# Patient Record
Sex: MALE | Race: WHITE
[De-identification: names, ages, dates, MRNs, and addresses within clinical notes are randomized per-mention and may not be internally consistent; named-entity substitution may affect disease eponyms.]

---

## 2018-10-07 ENCOUNTER — HOSPITAL ENCOUNTER (INPATIENT)
Dept: HOSPITAL 62 - ER | Age: 38
LOS: 4 days | Discharge: HOME | DRG: 854 | End: 2018-10-11
Attending: INTERNAL MEDICINE | Admitting: INTERNAL MEDICINE
Payer: SELF-PAY

## 2018-10-07 DIAGNOSIS — L03.115: ICD-10-CM

## 2018-10-07 DIAGNOSIS — L02.415: ICD-10-CM

## 2018-10-07 DIAGNOSIS — L03.116: ICD-10-CM

## 2018-10-07 DIAGNOSIS — R74.0: ICD-10-CM

## 2018-10-07 DIAGNOSIS — L02.416: ICD-10-CM

## 2018-10-07 DIAGNOSIS — A41.9: Primary | ICD-10-CM

## 2018-10-07 LAB
ADD MANUAL DIFF: NO
BASOPHILS # BLD AUTO: 0.1 10^3/UL (ref 0–0.2)
BASOPHILS NFR BLD AUTO: 0.5 % (ref 0–2)
EOSINOPHIL # BLD AUTO: 0.4 10^3/UL (ref 0–0.6)
EOSINOPHIL NFR BLD AUTO: 2.6 % (ref 0–6)
ERYTHROCYTE [DISTWIDTH] IN BLOOD BY AUTOMATED COUNT: 13.5 % (ref 11.5–14)
HCT VFR BLD CALC: 35.7 % (ref 37.9–51)
HGB BLD-MCNC: 12.1 G/DL (ref 13.5–17)
LYMPHOCYTES # BLD AUTO: 1.3 10^3/UL (ref 0.5–4.7)
LYMPHOCYTES NFR BLD AUTO: 9.4 % (ref 13–45)
MCH RBC QN AUTO: 30 PG (ref 27–33.4)
MCHC RBC AUTO-ENTMCNC: 33.9 G/DL (ref 32–36)
MCV RBC AUTO: 89 FL (ref 80–97)
MONOCYTES # BLD AUTO: 1.2 10^3/UL (ref 0.1–1.4)
MONOCYTES NFR BLD AUTO: 8.4 % (ref 3–13)
NEUTROPHILS # BLD AUTO: 11.2 10^3/UL (ref 1.7–8.2)
NEUTS SEG NFR BLD AUTO: 79.1 % (ref 42–78)
PLATELET # BLD: 306 10^3/UL (ref 150–450)
RBC # BLD AUTO: 4.03 10^6/UL (ref 4.35–5.55)
TOTAL CELLS COUNTED % (AUTO): 100 %
WBC # BLD AUTO: 14.1 10^3/UL (ref 4–10.5)

## 2018-10-07 PROCEDURE — 80076 HEPATIC FUNCTION PANEL: CPT

## 2018-10-07 PROCEDURE — 87205 SMEAR GRAM STAIN: CPT

## 2018-10-07 PROCEDURE — 87040 BLOOD CULTURE FOR BACTERIA: CPT

## 2018-10-07 PROCEDURE — 80307 DRUG TEST PRSMV CHEM ANLYZR: CPT

## 2018-10-07 PROCEDURE — 87070 CULTURE OTHR SPECIMN AEROBIC: CPT

## 2018-10-07 PROCEDURE — 01470 ANES PX NRV MSC LW L/A/F NOS: CPT

## 2018-10-07 PROCEDURE — A6266 IMPREG GAUZE NO H20/SAL/YARD: HCPCS

## 2018-10-07 PROCEDURE — 80053 COMPREHEN METABOLIC PANEL: CPT

## 2018-10-07 PROCEDURE — 87077 CULTURE AEROBIC IDENTIFY: CPT

## 2018-10-07 PROCEDURE — 96365 THER/PROPH/DIAG IV INF INIT: CPT

## 2018-10-07 PROCEDURE — 87186 SC STD MICRODIL/AGAR DIL: CPT

## 2018-10-07 PROCEDURE — 80048 BASIC METABOLIC PNL TOTAL CA: CPT

## 2018-10-07 PROCEDURE — 96375 TX/PRO/DX INJ NEW DRUG ADDON: CPT

## 2018-10-07 PROCEDURE — 87075 CULTR BACTERIA EXCEPT BLOOD: CPT

## 2018-10-07 PROCEDURE — 99284 EMERGENCY DEPT VISIT MOD MDM: CPT

## 2018-10-07 PROCEDURE — 36415 COLL VENOUS BLD VENIPUNCTURE: CPT

## 2018-10-07 PROCEDURE — 80202 ASSAY OF VANCOMYCIN: CPT

## 2018-10-07 PROCEDURE — 85025 COMPLETE CBC W/AUTO DIFF WBC: CPT

## 2018-10-08 LAB
ADD MANUAL DIFF: NO
ALBUMIN SERPL-MCNC: 3.2 G/DL (ref 3.5–5)
ALBUMIN SERPL-MCNC: 4 G/DL (ref 3.5–5)
ALP SERPL-CCNC: 95 U/L (ref 38–126)
ALP SERPL-CCNC: 98 U/L (ref 38–126)
ALT SERPL-CCNC: 126 U/L (ref 21–72)
ALT SERPL-CCNC: 128 U/L (ref 21–72)
ANION GAP SERPL CALC-SCNC: 10 MMOL/L (ref 5–19)
ANION GAP SERPL CALC-SCNC: 7 MMOL/L (ref 5–19)
AST SERPL-CCNC: 103 U/L (ref 17–59)
AST SERPL-CCNC: 86 U/L (ref 17–59)
BASOPHILS # BLD AUTO: 0 10^3/UL (ref 0–0.2)
BASOPHILS NFR BLD AUTO: 0.3 % (ref 0–2)
BILIRUB DIRECT SERPL-MCNC: 0.3 MG/DL (ref 0–0.4)
BILIRUB DIRECT SERPL-MCNC: 0.5 MG/DL (ref 0–0.4)
BILIRUB SERPL-MCNC: 0.5 MG/DL (ref 0.2–1.3)
BILIRUB SERPL-MCNC: 0.8 MG/DL (ref 0.2–1.3)
BUN SERPL-MCNC: 7 MG/DL (ref 7–20)
BUN SERPL-MCNC: 8 MG/DL (ref 7–20)
CALCIUM: 8.9 MG/DL (ref 8.4–10.2)
CALCIUM: 9.4 MG/DL (ref 8.4–10.2)
CHLORIDE SERPL-SCNC: 102 MMOL/L (ref 98–107)
CHLORIDE SERPL-SCNC: 98 MMOL/L (ref 98–107)
CO2 SERPL-SCNC: 27 MMOL/L (ref 22–30)
CO2 SERPL-SCNC: 31 MMOL/L (ref 22–30)
EOSINOPHIL # BLD AUTO: 0.4 10^3/UL (ref 0–0.6)
EOSINOPHIL NFR BLD AUTO: 3.4 % (ref 0–6)
ERYTHROCYTE [DISTWIDTH] IN BLOOD BY AUTOMATED COUNT: 13.5 % (ref 11.5–14)
GLUCOSE SERPL-MCNC: 167 MG/DL (ref 75–110)
GLUCOSE SERPL-MCNC: 91 MG/DL (ref 75–110)
HCT VFR BLD CALC: 33.5 % (ref 37.9–51)
HGB BLD-MCNC: 11.6 G/DL (ref 13.5–17)
LYMPHOCYTES # BLD AUTO: 0.8 10^3/UL (ref 0.5–4.7)
LYMPHOCYTES NFR BLD AUTO: 6.2 % (ref 13–45)
MCH RBC QN AUTO: 30.4 PG (ref 27–33.4)
MCHC RBC AUTO-ENTMCNC: 34.6 G/DL (ref 32–36)
MCV RBC AUTO: 88 FL (ref 80–97)
MONOCYTES # BLD AUTO: 1.3 10^3/UL (ref 0.1–1.4)
MONOCYTES NFR BLD AUTO: 10.1 % (ref 3–13)
NEUTROPHILS # BLD AUTO: 9.9 10^3/UL (ref 1.7–8.2)
NEUTS SEG NFR BLD AUTO: 80 % (ref 42–78)
PLATELET # BLD: 279 10^3/UL (ref 150–450)
POTASSIUM SERPL-SCNC: 3.6 MMOL/L (ref 3.6–5)
POTASSIUM SERPL-SCNC: 3.9 MMOL/L (ref 3.6–5)
PROT SERPL-MCNC: 5.6 G/DL (ref 6.3–8.2)
PROT SERPL-MCNC: 6.9 G/DL (ref 6.3–8.2)
RBC # BLD AUTO: 3.81 10^6/UL (ref 4.35–5.55)
SODIUM SERPL-SCNC: 136.4 MMOL/L (ref 137–145)
SODIUM SERPL-SCNC: 138.6 MMOL/L (ref 137–145)
TOTAL CELLS COUNTED % (AUTO): 100 %
WBC # BLD AUTO: 12.4 10^3/UL (ref 4–10.5)

## 2018-10-08 RX ADMIN — HEPARIN SODIUM SCH UNIT: 5000 INJECTION, SOLUTION INTRAVENOUS; SUBCUTANEOUS at 21:49

## 2018-10-08 RX ADMIN — DOXYCYCLINE SCH MLS/HR: 100 INJECTION, POWDER, LYOPHILIZED, FOR SOLUTION INTRAVENOUS at 10:02

## 2018-10-08 RX ADMIN — MORPHINE SULFATE PRN MG: 10 INJECTION INTRAMUSCULAR; INTRAVENOUS; SUBCUTANEOUS at 16:43

## 2018-10-08 RX ADMIN — MORPHINE SULFATE PRN MG: 10 INJECTION INTRAMUSCULAR; INTRAVENOUS; SUBCUTANEOUS at 09:59

## 2018-10-08 RX ADMIN — Medication SCH ML: at 21:51

## 2018-10-08 RX ADMIN — VANCOMYCIN HYDROCHLORIDE SCH MLS/HR: 1 INJECTION, POWDER, LYOPHILIZED, FOR SOLUTION INTRAVENOUS at 20:19

## 2018-10-08 RX ADMIN — HEPARIN SODIUM SCH UNIT: 5000 INJECTION, SOLUTION INTRAVENOUS; SUBCUTANEOUS at 06:32

## 2018-10-08 RX ADMIN — DOXYCYCLINE SCH MLS/HR: 100 INJECTION, POWDER, LYOPHILIZED, FOR SOLUTION INTRAVENOUS at 21:50

## 2018-10-08 RX ADMIN — HEPARIN SODIUM SCH UNIT: 5000 INJECTION, SOLUTION INTRAVENOUS; SUBCUTANEOUS at 13:00

## 2018-10-08 RX ADMIN — VANCOMYCIN HYDROCHLORIDE SCH MLS/HR: 1 INJECTION, POWDER, LYOPHILIZED, FOR SOLUTION INTRAVENOUS at 12:51

## 2018-10-08 RX ADMIN — CEFTRIAXONE SODIUM SCH MLS/HR: 1 INJECTION, POWDER, FOR SOLUTION INTRAMUSCULAR; INTRAVENOUS at 21:50

## 2018-10-08 RX ADMIN — NICOTINE PRN EACH: 21 PATCH, EXTENDED RELEASE TOPICAL at 20:20

## 2018-10-08 RX ADMIN — Medication SCH: at 13:01

## 2018-10-08 RX ADMIN — MORPHINE SULFATE PRN MG: 10 INJECTION INTRAMUSCULAR; INTRAVENOUS; SUBCUTANEOUS at 21:48

## 2018-10-08 RX ADMIN — Medication SCH ML: at 06:33
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
## 2018-10-08 NOTE — RADIOLOGY REPORT (SQ)
CLINICAL DATA:



38-year-old male with swelling of right foot



TECHNICAL DATA:



Two x-ray views of the right foot were performed on 10/8/2018 at

2:18 AM.



COMPARISONS: None



FINDINGS:



There is no evidence of fracture or dislocation. There is no

significant arthritis or degenerative change. No focal lytic or

sclerotic bone lesions are seen.  

 

Bone mineralization is normal



There is diffuse soft tissue swelling along the dorsal aspect of

the right foot.



IMPRESSION:



No evidence of acute osseous injury involving the right foot.

There is diffuse soft tissue swelling along the dorsal aspect of

the right foot.

## 2018-10-09 LAB
BARBITURATES UR QL SCN: NEGATIVE
METHADONE UR QL SCN: NEGATIVE
PCP UR QL SCN: NEGATIVE
URINE AMPHETAMINES SCREEN: (no result)
URINE BENZODIAZEPINES SCREEN: (no result)
URINE COCAINE SCREEN: NEGATIVE
URINE MARIJUANA (THC) SCREEN: NEGATIVE
VANCOMYCIN,TROUGH: 7.6 UG/ML (ref 5–20)

## 2018-10-09 PROCEDURE — 0J9P0ZZ DRAINAGE OF LEFT LOWER LEG SUBCUTANEOUS TISSUE AND FASCIA, OPEN APPROACH: ICD-10-PCS | Performed by: SURGERY

## 2018-10-09 PROCEDURE — 0JDP0ZZ EXTRACTION OF LEFT LOWER LEG SUBCUTANEOUS TISSUE AND FASCIA, OPEN APPROACH: ICD-10-PCS | Performed by: SURGERY

## 2018-10-09 PROCEDURE — 0JDM0ZZ EXTRACTION OF LEFT UPPER LEG SUBCUTANEOUS TISSUE AND FASCIA, OPEN APPROACH: ICD-10-PCS | Performed by: SURGERY

## 2018-10-09 PROCEDURE — 0JDN0ZZ EXTRACTION OF RIGHT LOWER LEG SUBCUTANEOUS TISSUE AND FASCIA, OPEN APPROACH: ICD-10-PCS | Performed by: SURGERY

## 2018-10-09 PROCEDURE — 0JDL0ZZ EXTRACTION OF RIGHT UPPER LEG SUBCUTANEOUS TISSUE AND FASCIA, OPEN APPROACH: ICD-10-PCS | Performed by: SURGERY

## 2018-10-09 RX ADMIN — MORPHINE SULFATE PRN MG: 10 INJECTION INTRAMUSCULAR; INTRAVENOUS; SUBCUTANEOUS at 13:33

## 2018-10-09 RX ADMIN — HEPARIN SODIUM SCH: 5000 INJECTION, SOLUTION INTRAVENOUS; SUBCUTANEOUS at 07:02

## 2018-10-09 RX ADMIN — OXYCODONE AND ACETAMINOPHEN PRN TAB: 5; 325 TABLET ORAL at 18:18

## 2018-10-09 RX ADMIN — Medication SCH ML: at 21:22

## 2018-10-09 RX ADMIN — DOXYCYCLINE SCH MLS/HR: 100 INJECTION, POWDER, LYOPHILIZED, FOR SOLUTION INTRAVENOUS at 09:23

## 2018-10-09 RX ADMIN — VANCOMYCIN HYDROCHLORIDE SCH MLS/HR: 1 INJECTION, POWDER, LYOPHILIZED, FOR SOLUTION INTRAVENOUS at 12:53

## 2018-10-09 RX ADMIN — MORPHINE SULFATE PRN MG: 10 INJECTION INTRAMUSCULAR; INTRAVENOUS; SUBCUTANEOUS at 08:35

## 2018-10-09 RX ADMIN — CEFTRIAXONE SODIUM SCH MLS/HR: 1 INJECTION, POWDER, FOR SOLUTION INTRAMUSCULAR; INTRAVENOUS at 21:28

## 2018-10-09 RX ADMIN — VANCOMYCIN HYDROCHLORIDE SCH MLS/HR: 1 INJECTION, POWDER, LYOPHILIZED, FOR SOLUTION INTRAVENOUS at 03:54

## 2018-10-09 RX ADMIN — Medication SCH: at 13:56

## 2018-10-09 RX ADMIN — DOXYCYCLINE SCH MLS/HR: 100 INJECTION, POWDER, LYOPHILIZED, FOR SOLUTION INTRAVENOUS at 21:17

## 2018-10-09 RX ADMIN — MORPHINE SULFATE PRN MG: 10 INJECTION INTRAMUSCULAR; INTRAVENOUS; SUBCUTANEOUS at 21:25

## 2018-10-09 RX ADMIN — MORPHINE SULFATE PRN MG: 10 INJECTION INTRAMUSCULAR; INTRAVENOUS; SUBCUTANEOUS at 17:25

## 2018-10-09 RX ADMIN — Medication SCH: at 07:03

## 2018-10-09 RX ADMIN — MORPHINE SULFATE PRN MG: 10 INJECTION INTRAMUSCULAR; INTRAVENOUS; SUBCUTANEOUS at 03:53

## 2018-10-09 RX ADMIN — HEPARIN SODIUM SCH: 5000 INJECTION, SOLUTION INTRAVENOUS; SUBCUTANEOUS at 13:56

## 2018-10-09 RX ADMIN — VANCOMYCIN HYDROCHLORIDE SCH MLS/HR: 1 INJECTION, POWDER, LYOPHILIZED, FOR SOLUTION INTRAVENOUS at 17:25

## 2018-10-09 RX ADMIN — OXYCODONE AND ACETAMINOPHEN PRN TAB: 5; 325 TABLET ORAL at 12:08

## 2018-10-09 RX ADMIN — HEPARIN SODIUM SCH UNIT: 5000 INJECTION, SOLUTION INTRAVENOUS; SUBCUTANEOUS at 21:42
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
## 2018-10-09 NOTE — OPERATIVE REPORT E
Operative Report



NAME: LIZBETH EDWARDS

MRN:  [id number]          : 1980 AGE:  38Y

DATE OF SURGERY: 10/09/2018                        ROOM: 530



PREOPERATIVE DIAGNOSIS:

Multiple abscesses of both legs.



POSTOPERATIVE DIAGNOSIS:

Multiple abscesses of both legs.



PROCEDURE:

Incision and drainage of about 7 abscesses.  The one on the left leg has

about a 2.5 cm, 2 cm and 1 cm.  The one on the right leg has 4.5 cm on the

right thigh, 3 cm, another 2 cm and 3 cm on the lower leg.



SURGEON:

DILLON ARRIOLA M.D.



DESCRIPTION OF PROCEDURE:

The patient was placed in the supine position on patient's stretcher and

given general anesthesia.  Both legs were then prepped and draped in the

usual sterile fashion.  Appropriate timeout was called.  Next, the left

thigh abscess site was enlarged to about 2.5 cm and the cavity curetted. 

It was then irrigated with saline solution.  Hemostasis obtained with

cautery.  Next, another abscess on the left below the knee area was

identified and incised to a distance of 2 cm.  Second abscess in the

cavity was curetted and cultures were obtained.  Hemostasis obtained with

cautery.  Next, a smaller abscess on the left above ankle anteriorly was

then incised to less than 1 cm and the contents also some exudate was

curetted.  Next, four other abscesses of the right leg were incised and

drained.  The one on the right thigh measures about 4.5 cm long after

incising and cavity curetted of purulent material.  It did not go down

through the fascia.  All of these abscess cavities were just above the

fascia.  Another area on the posterior calf was incised, a total of 3 cm. 

The cavity was then curetted and purulent material also removed and

specimen for C and S sent.  Hemostasis obtained with cautery.  Another

abscess cavity on the anterior area of the medial lower leg was then

incised and curetted;this was measuring 2 cm. Another 

on the lateral lower leg just above the ankle was then incised to a total

of 3 cm and the abscess cavity curetted until the cavity showed red

discoloration.  Hemostasis obtained with cautery.  Next, all of these

abscess sites were then packed with 0.25 inch Iodoform gauze.  Sterile

dressings, 4 x 4s, and ABD, and wrapped with Mary was done.  Also, Ace

bandages were placed over the dressings.  The patient tolerated the

procedure well.  Needle, instrument, and sponge count were all correct. 

The patient brought to the recovery room in satisfactory condition. 

Estimated blood loss about 10 mL.



DICTATING PHYSICIAN:  DILLON ARRIOLA M.D.





[id number]                  DT: 10/09/2018    1109

PHY#: [id number]            DD: 10/09/2018    1105

ID:   [id number]           JOB#: [id number]       ACCT: [id number]



cc:DILLON ARRIOLA M.D.

>







MTDD

## 2018-10-10 LAB
ADD MANUAL DIFF: NO
ALBUMIN SERPL-MCNC: 3 G/DL (ref 3.5–5)
ALP SERPL-CCNC: 104 U/L (ref 38–126)
ALT SERPL-CCNC: 132 U/L (ref 21–72)
ANION GAP SERPL CALC-SCNC: 8 MMOL/L (ref 5–19)
AST SERPL-CCNC: 71 U/L (ref 17–59)
BASOPHILS # BLD AUTO: 0.1 10^3/UL (ref 0–0.2)
BASOPHILS NFR BLD AUTO: 0.5 % (ref 0–2)
BILIRUB DIRECT SERPL-MCNC: 0.2 MG/DL (ref 0–0.4)
BILIRUB SERPL-MCNC: 0.2 MG/DL (ref 0.2–1.3)
BUN SERPL-MCNC: 12 MG/DL (ref 7–20)
CALCIUM: 9 MG/DL (ref 8.4–10.2)
CHLORIDE SERPL-SCNC: 106 MMOL/L (ref 98–107)
CO2 SERPL-SCNC: 26 MMOL/L (ref 22–30)
EOSINOPHIL # BLD AUTO: 0.5 10^3/UL (ref 0–0.6)
EOSINOPHIL NFR BLD AUTO: 4.5 % (ref 0–6)
ERYTHROCYTE [DISTWIDTH] IN BLOOD BY AUTOMATED COUNT: 13.7 % (ref 11.5–14)
GLUCOSE SERPL-MCNC: 121 MG/DL (ref 75–110)
HCT VFR BLD CALC: 34.8 % (ref 37.9–51)
HGB BLD-MCNC: 11.8 G/DL (ref 13.5–17)
LYMPHOCYTES # BLD AUTO: 1.4 10^3/UL (ref 0.5–4.7)
LYMPHOCYTES NFR BLD AUTO: 13.8 % (ref 13–45)
MCH RBC QN AUTO: 30.4 PG (ref 27–33.4)
MCHC RBC AUTO-ENTMCNC: 34 G/DL (ref 32–36)
MCV RBC AUTO: 89 FL (ref 80–97)
MONOCYTES # BLD AUTO: 1 10^3/UL (ref 0.1–1.4)
MONOCYTES NFR BLD AUTO: 10.5 % (ref 3–13)
NEUTROPHILS # BLD AUTO: 7 10^3/UL (ref 1.7–8.2)
NEUTS SEG NFR BLD AUTO: 70.7 % (ref 42–78)
PLATELET # BLD: 314 10^3/UL (ref 150–450)
POTASSIUM SERPL-SCNC: 4.2 MMOL/L (ref 3.6–5)
PROT SERPL-MCNC: 5.4 G/DL (ref 6.3–8.2)
RBC # BLD AUTO: 3.89 10^6/UL (ref 4.35–5.55)
SODIUM SERPL-SCNC: 139.7 MMOL/L (ref 137–145)
TOTAL CELLS COUNTED % (AUTO): 100 %
VANCOMYCIN,TROUGH: 12.5 UG/ML (ref 5–20)
WBC # BLD AUTO: 10 10^3/UL (ref 4–10.5)

## 2018-10-10 RX ADMIN — Medication SCH ML: at 22:08

## 2018-10-10 RX ADMIN — DOXYCYCLINE SCH MLS/HR: 100 INJECTION, POWDER, LYOPHILIZED, FOR SOLUTION INTRAVENOUS at 09:02

## 2018-10-10 RX ADMIN — OXYCODONE AND ACETAMINOPHEN PRN TAB: 5; 325 TABLET ORAL at 20:43

## 2018-10-10 RX ADMIN — VANCOMYCIN HYDROCHLORIDE SCH MLS/HR: 1 INJECTION, POWDER, LYOPHILIZED, FOR SOLUTION INTRAVENOUS at 00:34

## 2018-10-10 RX ADMIN — Medication SCH ML: at 14:00

## 2018-10-10 RX ADMIN — VANCOMYCIN HYDROCHLORIDE SCH MLS/HR: 1 INJECTION, POWDER, LYOPHILIZED, FOR SOLUTION INTRAVENOUS at 12:32

## 2018-10-10 RX ADMIN — HEPARIN SODIUM SCH UNIT: 5000 INJECTION, SOLUTION INTRAVENOUS; SUBCUTANEOUS at 05:36

## 2018-10-10 RX ADMIN — HEPARIN SODIUM SCH UNIT: 5000 INJECTION, SOLUTION INTRAVENOUS; SUBCUTANEOUS at 14:01

## 2018-10-10 RX ADMIN — NICOTINE PRN EACH: 21 PATCH, EXTENDED RELEASE TOPICAL at 12:39

## 2018-10-10 RX ADMIN — VANCOMYCIN HYDROCHLORIDE SCH MLS/HR: 1 INJECTION, POWDER, LYOPHILIZED, FOR SOLUTION INTRAVENOUS at 05:36

## 2018-10-10 RX ADMIN — MORPHINE SULFATE PRN MG: 10 INJECTION INTRAMUSCULAR; INTRAVENOUS; SUBCUTANEOUS at 05:35

## 2018-10-10 RX ADMIN — MORPHINE SULFATE PRN MG: 10 INJECTION INTRAMUSCULAR; INTRAVENOUS; SUBCUTANEOUS at 16:12

## 2018-10-10 RX ADMIN — OXYCODONE AND ACETAMINOPHEN PRN TAB: 5; 325 TABLET ORAL at 12:37

## 2018-10-10 RX ADMIN — VANCOMYCIN HYDROCHLORIDE SCH MLS/HR: 1 INJECTION, POWDER, LYOPHILIZED, FOR SOLUTION INTRAVENOUS at 17:51

## 2018-10-10 RX ADMIN — MORPHINE SULFATE PRN MG: 10 INJECTION INTRAMUSCULAR; INTRAVENOUS; SUBCUTANEOUS at 10:59

## 2018-10-10 RX ADMIN — Medication SCH ML: at 05:37

## 2018-10-10 RX ADMIN — HEPARIN SODIUM SCH UNIT: 5000 INJECTION, SOLUTION INTRAVENOUS; SUBCUTANEOUS at 22:06
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
## 2018-10-10 NOTE — PDOC PROGRESS REPORT
Subjective


Progress Note for:: 10/10/18


Reason For Visit: 


BILATERAL LE CELLULITIS








Physical Exam


Vital Signs: 


 











Temp Pulse Resp BP Pulse Ox


 


 98.4 F   65   14   128/68 H  99 


 


 10/10/18 13:00  10/10/18 13:00  10/10/18 13:00  10/10/18 13:00  10/10/18 13:00








 Intake & Output











 10/09/18 10/10/18 10/11/18





 06:59 06:59 06:59


 


Intake Total 2400 4044 1090


 


Output Total 900 1200 730


 


Balance 1500 2844 360


 


Weight 63.6 kg  














Results


Laboratory Results: 


 





 10/10/18 04:31 





 10/10/18 04:31 





 











  10/10/18 10/10/18





  04:31 04:31


 


WBC  10.0 


 


RBC  3.89 L 


 


Hgb  11.8 L 


 


Hct  34.8 L 


 


MCV  89 


 


MCH  30.4 


 


MCHC  34.0 


 


RDW  13.7 


 


Plt Count  314 


 


Seg Neutrophils %  70.7 


 


Lymphocytes %  13.8 


 


Monocytes %  10.5 


 


Eosinophils %  4.5 


 


Basophils %  0.5 


 


Absolute Neutrophils  7.0 


 


Absolute Lymphocytes  1.4 


 


Absolute Monocytes  1.0 


 


Absolute Eosinophils  0.5 


 


Absolute Basophils  0.1 


 


Sodium   139.7


 


Potassium   4.2


 


Chloride   106


 


Carbon Dioxide   26


 


Anion Gap   8


 


BUN   12


 


Creatinine   0.62


 


Est GFR ( Amer)   > 60


 


Est GFR (Non-Af Amer)   > 60


 


Glucose   121 H


 


Calcium   9.0


 


Total Bilirubin   0.2


 


AST   71 H


 


ALT   132 H


 


Alkaline Phosphatase   104


 


Total Protein   5.4 L


 


Albumin   3.0 L








 





10/08/18 03:35   Leg - Mcdowell   Gram Stain - Final


10/08/18 03:35   Leg - Shin   Wound Culture - Final


                            Mrsa (Meth Resis Staph Aureus)








Impressions: 


 





Foot X-Ray  10/08/18 00:11


IMPRESSION:


 


No evidence of acute osseous injury involving the right foot.


There is diffuse soft tissue swelling along the dorsal aspect of


the right foot.


 














Assessment & Plan





- Diagnosis


(1) Multiple abscesses of both legs


Is this a current diagnosis for this admission?: Yes   





- Plan Summary


Plan Summary: 





This a 30-year-old male status post I&D of multiple abscesses of bilateral 

lower extremities.  The dressings and packing were removed today.  His wounds 

are without significant purulence.  I have instructed him to keep the right leg 

elevated above the level of the heart.  It is okay for him to take a shower.  I 

have encouraged him to wash with soap and water.  He may use Neosporin on the 

incisions when replacing a dressing.  Will follow.

## 2018-10-11 VITALS — SYSTOLIC BLOOD PRESSURE: 130 MMHG | DIASTOLIC BLOOD PRESSURE: 77 MMHG

## 2018-10-11 LAB
ADD MANUAL DIFF: NO
ANION GAP SERPL CALC-SCNC: 5 MMOL/L (ref 5–19)
BASOPHILS # BLD AUTO: 0.1 10^3/UL (ref 0–0.2)
BASOPHILS NFR BLD AUTO: 0.9 % (ref 0–2)
BUN SERPL-MCNC: 8 MG/DL (ref 7–20)
CALCIUM: 9.2 MG/DL (ref 8.4–10.2)
CHLORIDE SERPL-SCNC: 106 MMOL/L (ref 98–107)
CO2 SERPL-SCNC: 29 MMOL/L (ref 22–30)
EOSINOPHIL # BLD AUTO: 0.4 10^3/UL (ref 0–0.6)
EOSINOPHIL NFR BLD AUTO: 5.8 % (ref 0–6)
ERYTHROCYTE [DISTWIDTH] IN BLOOD BY AUTOMATED COUNT: 13.4 % (ref 11.5–14)
GLUCOSE SERPL-MCNC: 99 MG/DL (ref 75–110)
HCT VFR BLD CALC: 35.1 % (ref 37.9–51)
HGB BLD-MCNC: 11.9 G/DL (ref 13.5–17)
LYMPHOCYTES # BLD AUTO: 1.6 10^3/UL (ref 0.5–4.7)
LYMPHOCYTES NFR BLD AUTO: 21.5 % (ref 13–45)
MCH RBC QN AUTO: 29.9 PG (ref 27–33.4)
MCHC RBC AUTO-ENTMCNC: 33.8 G/DL (ref 32–36)
MCV RBC AUTO: 88 FL (ref 80–97)
MONOCYTES # BLD AUTO: 0.8 10^3/UL (ref 0.1–1.4)
MONOCYTES NFR BLD AUTO: 11.3 % (ref 3–13)
NEUTROPHILS # BLD AUTO: 4.4 10^3/UL (ref 1.7–8.2)
NEUTS SEG NFR BLD AUTO: 60.5 % (ref 42–78)
PLATELET # BLD: 308 10^3/UL (ref 150–450)
POTASSIUM SERPL-SCNC: 4.3 MMOL/L (ref 3.6–5)
RBC # BLD AUTO: 3.97 10^6/UL (ref 4.35–5.55)
SODIUM SERPL-SCNC: 140.1 MMOL/L (ref 137–145)
TOTAL CELLS COUNTED % (AUTO): 100 %
WBC # BLD AUTO: 7.2 10^3/UL (ref 4–10.5)

## 2018-10-11 RX ADMIN — MORPHINE SULFATE PRN MG: 10 INJECTION INTRAMUSCULAR; INTRAVENOUS; SUBCUTANEOUS at 00:35

## 2018-10-11 RX ADMIN — VANCOMYCIN HYDROCHLORIDE SCH MLS/HR: 1 INJECTION, POWDER, LYOPHILIZED, FOR SOLUTION INTRAVENOUS at 00:36

## 2018-10-11 RX ADMIN — HEPARIN SODIUM SCH UNIT: 5000 INJECTION, SOLUTION INTRAVENOUS; SUBCUTANEOUS at 05:34

## 2018-10-11 RX ADMIN — VANCOMYCIN HYDROCHLORIDE SCH MLS/HR: 1 INJECTION, POWDER, LYOPHILIZED, FOR SOLUTION INTRAVENOUS at 05:30

## 2018-10-11 RX ADMIN — OXYCODONE AND ACETAMINOPHEN PRN TAB: 5; 325 TABLET ORAL at 11:01

## 2018-10-11 RX ADMIN — Medication SCH ML: at 05:33

## 2018-10-11 RX ADMIN — MORPHINE SULFATE PRN MG: 10 INJECTION INTRAMUSCULAR; INTRAVENOUS; SUBCUTANEOUS at 08:36

## 2018-10-11 NOTE — PDOC PROGRESS REPORT
Subjective


Progress Note for:: 10/11/18


Subjective:: 





Pains along I&D sies both legs


Reason For Visit: 


BILATERAL LE CELLULITIS








Physical Exam


Vital Signs: 


 











Temp Pulse Resp BP Pulse Ox


 


 98.6 F   64   16   117/67   99 


 


 10/11/18 07:33  10/11/18 07:33  10/11/18 07:33  10/11/18 07:33  10/11/18 07:33








 Intake & Output











 10/10/18 10/11/18 10/12/18





 06:59 06:59 06:59


 


Intake Total 4044 2667 


 


Output Total 1200 1830 


 


Balance 2844 837 


 


Weight  63.3 kg 











Exam: 





All wounds relatively clean and dry








Results


Laboratory Results: 


 





 10/11/18 04:19 





 10/11/18 04:19 





 











  10/11/18 10/11/18





  04:19 04:19


 


WBC  7.2 


 


RBC  3.97 L 


 


Hgb  11.9 L 


 


Hct  35.1 L 


 


MCV  88 


 


MCH  29.9 


 


MCHC  33.8 


 


RDW  13.4 


 


Plt Count  308 


 


Seg Neutrophils %  60.5 


 


Lymphocytes %  21.5 


 


Monocytes %  11.3 


 


Eosinophils %  5.8 


 


Basophils %  0.9 


 


Absolute Neutrophils  4.4 


 


Absolute Lymphocytes  1.6 


 


Absolute Monocytes  0.8 


 


Absolute Eosinophils  0.4 


 


Absolute Basophils  0.1 


 


Sodium   140.1


 


Potassium   4.3


 


Chloride   106


 


Carbon Dioxide   29


 


Anion Gap   5


 


BUN   8


 


Creatinine   0.57


 


Est GFR ( Amer)   > 60


 


Est GFR (Non-Af Amer)   > 60


 


Glucose   99


 


Calcium   9.2








 





10/08/18 03:35   Leg - Mcdowell   Gram Stain - Final


10/08/18 03:35   Leg - Shin   Wound Culture - Final


                            Mrsa (Meth Resis Staph Aureus)








Impressions: 


 





Foot X-Ray  10/08/18 00:11


IMPRESSION:


 


No evidence of acute osseous injury involving the right foot.


There is diffuse soft tissue swelling along the dorsal aspect of


the right foot.


 














Assessment & Plan





- Diagnosis


(1) MRSA (methicillin resistant staph aureus) culture positive


Is this a current diagnosis for this admission?: Yes   





- Time


Time Spent with patient: 15-24 minutes





- Inpatient Certification


Medical Necessity: Need for Pain Control, Need for IV Antibiotics





- Plan Summary


Plan Summary: 





Start wet to dry dressings BID


Continue IV antibiotics 24-48 hrs


Will sign off
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
## 2018-10-16 ENCOUNTER — HOSPITAL ENCOUNTER (EMERGENCY)
Dept: HOSPITAL 62 - ER | Age: 38
Discharge: LEFT BEFORE DISCHARGE | End: 2018-10-16
Payer: SELF-PAY

## 2018-10-16 DIAGNOSIS — Z53.21: Primary | ICD-10-CM
